# Patient Record
Sex: MALE | Race: WHITE | URBAN - METROPOLITAN AREA
[De-identification: names, ages, dates, MRNs, and addresses within clinical notes are randomized per-mention and may not be internally consistent; named-entity substitution may affect disease eponyms.]

---

## 2017-02-07 VITALS
WEIGHT: 183 LBS | SYSTOLIC BLOOD PRESSURE: 164 MMHG | OXYGEN SATURATION: 99 % | HEIGHT: 71 IN | BODY MASS INDEX: 25.62 KG/M2 | TEMPERATURE: 98.2 F | DIASTOLIC BLOOD PRESSURE: 89 MMHG

## 2017-02-07 PROCEDURE — 99283 EMERGENCY DEPT VISIT LOW MDM: CPT

## 2017-02-08 ENCOUNTER — APPOINTMENT (OUTPATIENT)
Dept: GENERAL RADIOLOGY | Facility: CLINIC | Age: 47
End: 2017-02-08
Attending: EMERGENCY MEDICINE
Payer: COMMERCIAL

## 2017-02-08 ENCOUNTER — HOSPITAL ENCOUNTER (EMERGENCY)
Facility: CLINIC | Age: 47
Discharge: HOME | End: 2017-02-08
Attending: EMERGENCY MEDICINE | Admitting: EMERGENCY MEDICINE
Payer: COMMERCIAL

## 2017-02-08 DIAGNOSIS — S42.021A CLOSED DISPLACED FRACTURE OF SHAFT OF RIGHT CLAVICLE, INITIAL ENCOUNTER: ICD-10-CM

## 2017-02-08 PROCEDURE — 25000132 ZZH RX MED GY IP 250 OP 250 PS 637: Performed by: EMERGENCY MEDICINE

## 2017-02-08 PROCEDURE — 73000 X-RAY EXAM OF COLLAR BONE: CPT | Mod: RT

## 2017-02-08 RX ORDER — OXYCODONE HYDROCHLORIDE 5 MG/1
5 TABLET ORAL ONCE
Status: COMPLETED | OUTPATIENT
Start: 2017-02-08 | End: 2017-02-08

## 2017-02-08 RX ORDER — OXYCODONE HYDROCHLORIDE 5 MG/1
5 TABLET ORAL EVERY 6 HOURS PRN
Qty: 15 TABLET | Refills: 0 | Status: SHIPPED | OUTPATIENT
Start: 2017-02-08

## 2017-02-08 RX ADMIN — OXYCODONE HYDROCHLORIDE 5 MG: 5 TABLET ORAL at 01:09

## 2017-02-08 ASSESSMENT — ENCOUNTER SYMPTOMS: NUMBNESS: 0

## 2017-02-08 NOTE — ED AVS SNAPSHOT
Emergency Department    [address] [facility] [address]    Phone:  [phone]    Fax:  [phone]                                       Bryan Campa   MRN: [id number]    Department:   Emergency Department   Date of Visit:  2/7/2017           Patient Information     Date Of Birth          1970        Your diagnoses for this visit were:     Closed displaced fracture of shaft of right clavicle, initial encounter        You were seen by Kin Thorne MD.      Follow-up Information     Follow up with [facility]. Schedule an appointment as soon as possible for a visit in 3 days.    Contact information:    [address] [place] [phone]        Follow up with  Emergency Department.    Specialty:  EMERGENCY MEDICINE    Why:  If symptoms worsen    Contact information:    [address] [facility] [id number] [phone]        Discharge Instructions         Collarbone Fracture  You have a break (fracture) in your collarbone (clavicle). This will cause swelling, pain, and bruising. The first 3 to 4 weeks will be the most painful. This is because deep breathing, coughing, or changing position from sitting to lying down may cause the broken ends to move slightly.   The fracture will heal in about 4 to 6 weeks. Most people can return to normal activities in about 3 months. In children this injury will heal by reshaping the bone back to normal. In adults, a noticeable bump in the bone may remain.  Treatment is with a sling or a special type of arm sling called a shoulder immobilizer. This supports your arm and eases pain.  Home care  Follow these guidelines when caring for yourself or your child at home:    Put an ice pack on the injured area. Do this for 20 minutes every 1 to 2 hours on the first day. You can make an ice pack by wrapping a plastic bag of ice cubes in a thin towel. Keep using the ice pack 3 to 4 times a day  for the next 2 days. Then use it as needed to ease pain and swelling.    If you were given a sling or shoulder immobilizer, wear it for comfort. You may take it off when you bathe or sleep. Take your arm out of the sling for a little while each day and move your shoulder to keep it from getting stiff.    Don t do any heavy lifting or raise the injured arm overhead until you are pain-free. Your child shouldn t play sports or do physical education class for at least 4 weeks, or until the health care provider says it s OK to do so.    You may use acetaminophen or ibuprofen to control pain, unless another pain medicine was prescribed. If you have chronic liver or kidney disease, talk with your health care provider before using these medicines. Also talk with your provider if you ve had a stomach ulcer or GI bleeding.    Your doctor may refer you to physical therapy for shoulder exercises once it starts to heal.    You may need surgery if the bones are out of place (displaced). Surgery will put them in better alignment while they heal. This leads to better strength when you have healed.  Follow-up care  Follow up with your health care provider within 1 week, or as advised. This is to be sure the bone is healing the way it should.  If you had X-rays taken, a radiologist will look at them. You will be told of any new findings that may affect your care.  When to seek medical advice  Call your health care provider right away if any of these occur:    Swelling in your collarbone gets worse    Large area of bruising over the collarbone    Fingers become swollen, cold, blue, numb, or tingly    Shortness of breath, dizziness, or general weakness    Weakness or swelling in your arm    Any redness, drainage, or pus coming from the wound    4568-6519 [employer]. [address] [place] [address], [place] [address]. All rights reserved. This information is not intended as a substitute for professional medical care. Always follow  your healthcare professional's instructions.          24 Hour Appointment Hotline       To make an appointment at any [facility], call [phone] ([phone]). If you don't have a family doctor or clinic, we will help you find one. [facility] clinics are conveniently located to serve the needs of you and your family.             Review of your medicines      START taking        Dose / Directions Last dose taken    oxyCODONE 5 MG IR tablet   Commonly known as:  ROXICODONE   Dose:  5 mg   Quantity:  15 tablet        Take 1 tablet (5 mg) by mouth every 6 hours as needed for pain   Refills:  0          Our records show that you are taking the medicines listed below. If these are incorrect, please call your family doctor or clinic.        Dose / Directions Last dose taken    IBUPROFEN PO        Refills:  0                Prescriptions were sent or printed at these locations (1 Prescription)                   Other Prescriptions                Printed at Department/Unit printer (1 of 1)         oxyCODONE (ROXICODONE) 5 MG IR tablet                Procedures and tests performed during your visit     Clavicle XR, right      Orders Needing Specimen Collection     None      Pending Results     Date and Time Order Name Status Description    2/8/2017 0009 Clavicle XR, right Preliminary             Pending Culture Results     No orders found from 2/7/2017 to 2/9/2017.       Test Results from your hospital stay           2/8/2017 12:43 AM - Interface, Radiant Ib      Narrative     XR CLAVICLE RT 2 VIEWS  2/8/2017 12:21 AM      HISTORY: Trauma.     COMPARISON: None.        Impression     IMPRESSION: Comminuted fracture of the midclavicle with depression of  the lateral segment.                Clinical Quality Measure: Blood Pressure Screening     Your blood pressure was checked while you were in the emergency department today. The last reading we obtained was  BP: 164/89 mmHg . Please read the guidelines below about  "what these numbers mean and what you should do about them.  If your systolic blood pressure (the top number) is less than 120 and your diastolic blood pressure (the bottom number) is less than 80, then your blood pressure is normal. There is nothing more that you need to do about it.  If your systolic blood pressure (the top number) is 120-139 or your diastolic blood pressure (the bottom number) is 80-89, your blood pressure may be higher than it should be. You should have your blood pressure rechecked within a year by a primary care provider.  If your systolic blood pressure (the top number) is 140 or greater or your diastolic blood pressure (the bottom number) is 90 or greater, you may have high blood pressure. High blood pressure is treatable, but if left untreated over time it can put you at risk for heart attack, stroke, or kidney failure. You should have your blood pressure rechecked by a primary care provider within the next 4 weeks.  If your provider in the emergency department today gave you specific instructions to follow-up with your doctor or provider even sooner than that, you should follow that instruction and not wait for up to 4 weeks for your follow-up visit.        Thank you for choosing [facility]       Thank you for choosing [facility] for your care. Our goal is always to provide you with excellent care. Hearing back from our patients is one way we can continue to improve our services. Please take a few minutes to complete the written survey that you may receive in the mail after you visit with us. Thank you!        [employer] Information     [employer] lets you send messages to your doctor, view your test results, renew your prescriptions, schedule appointments and more. To sign up, go to [email or web address] . Click on \"Log in\" on the left side of the screen, which will take you to the Welcome page. Then click on \"Sign up Now\" on the right side of the page.     You will be asked to enter the access " code listed below, as well as some personal information. Please follow the directions to create your username and password.     Your access code is: [email or web address]  Expires: 2017  1:11 AM     Your access code will  in 90 days. If you need help or a new code, please call your [facility] clinic or [phone].        Care EveryWhere ID     This is your Care EveryWhere ID. This could be used by other organizations to access your [facility] medical records  [id number]        After Visit Summary       This is your record. Keep this with you and show to your community pharmacist(s) and doctor(s) at your next visit.

## 2017-02-08 NOTE — ED AVS SNAPSHOT
Emergency Department    [address] [facility] [address]    Phone:  [phone]    Fax:  [phone]                                       Bryan Campa   MRN: [id number]    Department:   Emergency Department   Date of Visit:  2/7/2017           After Visit Summary Signature Page     I have received my discharge instructions, and my questions have been answered. I have discussed any challenges I see with this plan with the nurse or doctor.    ..........................................................................................................................................  Patient/Patient Representative Signature      ..........................................................................................................................................  Patient Representative Print Name and Relationship to Patient    ..................................................               ................................................  Date                                            Time    ..........................................................................................................................................  Reviewed by Signature/Title    ...................................................              ..............................................  Date                                                            Time

## 2017-02-08 NOTE — ED PROVIDER NOTES
"  History     Chief Complaint:  Shoulder Pain    HPI   Bryan Campa is a right-hand dominant 46 year old male who presents with right shoulder pain. The patient reports that just prior to presentation he was walking his dog when the dog pulled him forward and he slipped on the ice. The patient landed primarily on the right elbow and heard a \"pop\" in the right shoulder and clavicle. He did not hit his head or lose consciousness during the fall. The patient has since had 6/10 pain to the right shoulder, clavicle, and elbow. He denies any numbness or tingling.     Allergies:  The patient has no known drug allergies.     Medications:    Ibuprofen    Past Medical History:    History reviewed. No pertinent past medical history.    Past Surgical History:    History reviewed. No pertinent past surgical history.    Family History:    History reviewed.  No pertinent family history.      Social History:  Relationship status: Single  Tobacco use: Negative  Alcohol use: Positive (a couple beers twice a week)  The patient presents with his significant other.     Review of Systems   HENT:        Negative for head injury.    Musculoskeletal:        Positive for right shoulder, clavicle, and elbow pain.    Neurological: Negative for syncope and numbness.        Negative for paresthesias.    All other systems reviewed and are negative.    Physical Exam   First Vitals:  BP: 164/89 mmHg  Heart Rate: 77  Temp: 98.2  F (36.8  C)  Height: 180.3 cm (5' 11\")  Weight: 83.008 kg (183 lb)  SpO2: 99 %    Physical Exam  General:   Well-nourished   Speaking in full sentences  Head:   No palpable hematoma or step-off   No open wounds  Eyes:   Conjunctiva without injection or scleral icterus   PERRL   Pupils 3 mm bilaterally  ENT:   Moist mucous membranes   Posterior oropharynx clear without erythema or exudate   No hemotympanum   Nares patent   Pinnae normal  Neck:   Full ROM   No stiffness appreciated  Resp:   Lungs CTAB   No crackles, " wheezing or audible rubs   Good air movement  CV:    Normal rate, regular rhythm   S1 and S2 present   No murmur, gallop or rub  GI:   BS present   Abdomen soft without distention   Non-tender to light and deep palpation   No guarding or rebound tenderness  Skin:   Warm, dry, well perfused   No rashes or open wounds on exposed skin  MSK:   Region of swelling over mid-shaft of R clavicle   No openings through skin   Able to passively internally/externally rotate at shoulder   Full flexion/extension function at the elbow   Extremity warm, well-perfused   Compartments are soft   2+ radial pulse  Neuro:   Alert   SILT in BUE   Answers questions appropriately   Moves all extremities equally   Gait stable  Psych:   Normal affect, normal mood    Emergency Department Course   Imaging:  Radiographic findings were communicated with the patient who voiced understanding of the findings.    Right Clavicle XR, per radiology:   Comminuted fracture of the midclavicle with depression of the lateral segment.     Interventions:  0109: Roxicodone, 5 mg, PO    Emergency Department Course:  Nursing notes and vitals reviewed.  I performed an exam of the patient as documented above.  The above workup was undertaken.  0057: The patient was offered pain medications while in the ED; they are declining this at this time.    0059: I discussed XR results.  The patient was placed in a sling.   Findings and plan explained to the Patient. Patient discharged home with instructions regarding supportive care, medications, and reasons to return. The importance of close follow-up was reviewed. The patient was prescribed Roxicodone.      Impression & Plan    Medical Decision Making:  Bryan Campa is a 46 year old male who presents for evaluation of right shoulder pain after slipping and falling on the ice. CMS is intact distally in the extremity.  Xrays reveal a clavicle fracture.  No signs of shoulder dislocation, distal nerve compromise, sternal  head dislocation or open fracture. They understand that this need for reduction and/or surgery may change with time and orthopedic consultation.  There is no indication for ortho consultation from the ED. Rather, close follow-up is indicated.  Roxicodone prescribed and sling placed.  The patients head to toe trauma exam is otherwise normal at this time and no further trauma workup is needed as I believe there is no signs of serious head, neck, chest, spinal, extremity or abdominal injuries.      Diagnosis:    ICD-10-CM   1. Closed displaced fracture of shaft of right clavicle, initial encounter S42.021A     Disposition:  Discharge home with orthopedics follow up.     Discharge Medications:  New Prescriptions    OXYCODONE (ROXICODONE) 5 MG IR TABLET    Take 1 tablet (5 mg) by mouth every 6 hours as needed for pain       Kristen SHEPHERD, staci serving as a scribe on 2/8/2017 at 12:56 AM to personally document services performed by Kin Thorne MD, based on my observations and the provider's statements to me.     EMERGENCY DEPARTMENT    Kin Thorne MD  02/08/17 2572

## 2017-02-08 NOTE — DISCHARGE INSTRUCTIONS
Collarbone Fracture  You have a break (fracture) in your collarbone (clavicle). This will cause swelling, pain, and bruising. The first 3 to 4 weeks will be the most painful. This is because deep breathing, coughing, or changing position from sitting to lying down may cause the broken ends to move slightly.   The fracture will heal in about 4 to 6 weeks. Most people can return to normal activities in about 3 months. In children this injury will heal by reshaping the bone back to normal. In adults, a noticeable bump in the bone may remain.  Treatment is with a sling or a special type of arm sling called a shoulder immobilizer. This supports your arm and eases pain.  Home care  Follow these guidelines when caring for yourself or your child at home:    Put an ice pack on the injured area. Do this for 20 minutes every 1 to 2 hours on the first day. You can make an ice pack by wrapping a plastic bag of ice cubes in a thin towel. Keep using the ice pack 3 to 4 times a day for the next 2 days. Then use it as needed to ease pain and swelling.    If you were given a sling or shoulder immobilizer, wear it for comfort. You may take it off when you bathe or sleep. Take your arm out of the sling for a little while each day and move your shoulder to keep it from getting stiff.    Don t do any heavy lifting or raise the injured arm overhead until you are pain-free. Your child shouldn t play sports or do physical education class for at least 4 weeks, or until the health care provider says it s OK to do so.    You may use acetaminophen or ibuprofen to control pain, unless another pain medicine was prescribed. If you have chronic liver or kidney disease, talk with your health care provider before using these medicines. Also talk with your provider if you ve had a stomach ulcer or GI bleeding.    Your doctor may refer you to physical therapy for shoulder exercises once it starts to heal.    You may need surgery if the bones are out  of place (displaced). Surgery will put them in better alignment while they heal. This leads to better strength when you have healed.  Follow-up care  Follow up with your health care provider within 1 week, or as advised. This is to be sure the bone is healing the way it should.  If you had X-rays taken, a radiologist will look at them. You will be told of any new findings that may affect your care.  When to seek medical advice  Call your health care provider right away if any of these occur:    Swelling in your collarbone gets worse    Large area of bruising over the collarbone    Fingers become swollen, cold, blue, numb, or tingly    Shortness of breath, dizziness, or general weakness    Weakness or swelling in your arm    Any redness, drainage, or pus coming from the wound    7026-8480 [employer]. [address] [place] [address], [place] [address]. All rights reserved. This information is not intended as a substitute for professional medical care. Always follow your healthcare professional's instructions.